# Patient Record
Sex: FEMALE | Race: WHITE | Employment: OTHER | URBAN - METROPOLITAN AREA
[De-identification: names, ages, dates, MRNs, and addresses within clinical notes are randomized per-mention and may not be internally consistent; named-entity substitution may affect disease eponyms.]

---

## 2017-06-19 ENCOUNTER — HOSPITAL ENCOUNTER (OUTPATIENT)
Dept: WOMENS IMAGING | Age: 77
Discharge: HOME | End: 2017-06-19
Payer: MEDICARE

## 2017-06-19 DIAGNOSIS — Z13.9 SCREENING: ICD-10-CM

## 2017-06-19 PROCEDURE — G0202 SCR MAMMO BI INCL CAD: HCPCS

## 2018-07-19 ENCOUNTER — HOSPITAL ENCOUNTER (OUTPATIENT)
Dept: WOMENS IMAGING | Age: 78
Discharge: HOME | End: 2018-07-21
Payer: MEDICARE

## 2018-07-19 DIAGNOSIS — Z12.31 ENCOUNTER FOR SCREENING MAMMOGRAM FOR BREAST CANCER: ICD-10-CM

## 2018-07-19 PROCEDURE — 77067 SCR MAMMO BI INCL CAD: CPT

## 2019-08-08 ENCOUNTER — HOSPITAL ENCOUNTER (OUTPATIENT)
Dept: WOMENS IMAGING | Age: 79
Discharge: HOME | End: 2019-08-10
Payer: MEDICARE

## 2019-08-08 DIAGNOSIS — Z12.31 ENCOUNTER FOR SCREENING MAMMOGRAM FOR BREAST CANCER: ICD-10-CM

## 2019-08-08 PROCEDURE — 77067 SCR MAMMO BI INCL CAD: CPT

## 2020-10-27 ENCOUNTER — HOSPITAL ENCOUNTER (OUTPATIENT)
Dept: WOMENS IMAGING | Age: 80
Discharge: HOME | End: 2020-10-29
Payer: MEDICARE

## 2020-10-27 PROCEDURE — 77067 SCR MAMMO BI INCL CAD: CPT

## 2023-01-17 ENCOUNTER — PREP FOR PROCEDURE (OUTPATIENT)
Dept: GASTROENTEROLOGY | Age: 83
End: 2023-01-17

## 2023-01-17 ENCOUNTER — TELEPHONE (OUTPATIENT)
Dept: GASTROENTEROLOGY | Age: 83
End: 2023-01-17

## 2023-01-17 ENCOUNTER — OFFICE VISIT (OUTPATIENT)
Dept: GASTROENTEROLOGY | Age: 83
End: 2023-01-17
Payer: MEDICARE

## 2023-01-17 VITALS
SYSTOLIC BLOOD PRESSURE: 118 MMHG | WEIGHT: 87 LBS | HEART RATE: 62 BPM | DIASTOLIC BLOOD PRESSURE: 62 MMHG | OXYGEN SATURATION: 100 % | BODY MASS INDEX: 16.42 KG/M2 | HEIGHT: 61 IN

## 2023-01-17 DIAGNOSIS — R10.13 EPIGASTRIC ABDOMINAL PAIN: Primary | ICD-10-CM

## 2023-01-17 PROCEDURE — 1123F ACP DISCUSS/DSCN MKR DOCD: CPT | Performed by: INTERNAL MEDICINE

## 2023-01-17 PROCEDURE — 99204 OFFICE O/P NEW MOD 45 MIN: CPT | Performed by: INTERNAL MEDICINE

## 2023-01-17 PROCEDURE — 4004F PT TOBACCO SCREEN RCVD TLK: CPT | Performed by: INTERNAL MEDICINE

## 2023-01-17 PROCEDURE — 1090F PRES/ABSN URINE INCON ASSESS: CPT | Performed by: INTERNAL MEDICINE

## 2023-01-17 PROCEDURE — G8400 PT W/DXA NO RESULTS DOC: HCPCS | Performed by: INTERNAL MEDICINE

## 2023-01-17 PROCEDURE — G8484 FLU IMMUNIZE NO ADMIN: HCPCS | Performed by: INTERNAL MEDICINE

## 2023-01-17 PROCEDURE — G8419 CALC BMI OUT NRM PARAM NOF/U: HCPCS | Performed by: INTERNAL MEDICINE

## 2023-01-17 PROCEDURE — G8427 DOCREV CUR MEDS BY ELIG CLIN: HCPCS | Performed by: INTERNAL MEDICINE

## 2023-01-17 RX ORDER — SUCRALFATE 1 G/1
1 TABLET ORAL 4 TIMES DAILY
Qty: 120 TABLET | Refills: 3 | Status: SHIPPED | OUTPATIENT
Start: 2023-01-17

## 2023-01-17 RX ORDER — DICYCLOMINE HCL 20 MG
20 TABLET ORAL DAILY
COMMUNITY
Start: 2021-10-18

## 2023-01-17 RX ORDER — PANTOPRAZOLE SODIUM 40 MG/1
TABLET, DELAYED RELEASE ORAL
COMMUNITY
Start: 2023-01-16

## 2023-01-17 RX ORDER — LORAZEPAM 0.5 MG/1
0.5 TABLET ORAL DAILY PRN
COMMUNITY
Start: 2019-03-12

## 2023-01-17 RX ORDER — FAMOTIDINE 20 MG/1
20 TABLET, FILM COATED ORAL 2 TIMES DAILY
COMMUNITY

## 2023-01-17 RX ORDER — SUCRALFATE ORAL 1 G/10ML
1 SUSPENSION ORAL 4 TIMES DAILY
Qty: 1200 ML | Refills: 3 | Status: SHIPPED | OUTPATIENT
Start: 2023-01-17

## 2023-01-17 RX ORDER — IBUPROFEN 200 MG
1 CAPSULE ORAL DAILY
COMMUNITY

## 2023-01-17 RX ORDER — ATORVASTATIN CALCIUM 10 MG/1
TABLET, FILM COATED ORAL
COMMUNITY
Start: 2022-11-16

## 2023-01-17 NOTE — PROGRESS NOTES
Gastroenterology Clinic Visit    Zuleika Ramon  [id number]  Chief Complaint   Patient presents with    New Patient     HPI: [de-identified] y.o. female presents to the clinic with her daughter history of sudden onset of upper abdominal pain that started approximately a week ago. Patient initially presented to the [facility] and then evaluated in the emergency room at [facility]. Patient reported pain across the upper abdomen under both ribs and epigastrium in the setting of not having bowel movements for 3 days. 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 Patient on evaluation emergency room showed normal CBC, comprehensive metabolic panel, lipase, CT scan of the abdomen with mildly dilated common bile duct measuring 11 mm in diameter with abrupt tapering at the head of the pancreas. No obvious pancreatic lesion was identified. Patient advised to undergo MRI/MRCP, however cannot proceed with MRCP given presence of a implanted device. Patient additionally was advised to follow-up with pancreatobiliary specialist.    Patient reports improvement but mild ongoing persistence of symptoms despite bowel movements. She was started on Bentyl which she takes once a day, and pantoprazole. She has been on Imodium for many years. She does endorse some weight loss, history of smoking smokes 2 to 4 cigarettes a day. Denies ever smoking more than 5 cigarettes a day. Denies any history of excess alcohol use. Given history of weight loss, history of smoking, abnormal CT scan a concern was raised in the emergency room regarding possible pancreatic lesion as the etiology for her symptoms and dilated common bile duct.   Patient subsequently made a urgent appointment here at [facility].    No family history of pancreatic disease/primary cancer    Past medical history of right rib fractures #4-8 in 2021 after a fall, incidental finding of moderate centrally lobular emphysema    Previous GI work up/Endoscopic investigations:   Colonoscopy: 2015 normal per patient, in  prior to this    Review of Systems   All other systems reviewed and are negative. No past medical history on file. No past surgical history on file. Current Outpatient Medications on File Prior to Visit   Medication Sig Dispense Refill    atorvastatin (LIPITOR) 10 MG tablet TAKE 1 TABLET EVERY DAY AT SUPPER TIME.      calcium carbonate (OYSTER SHELL CALCIUM 500 MG) 1250 (500 Ca) MG tablet Take 1 tablet by mouth daily      vitamin D3 (CHOLECALCIFEROL) 125 MCG (5000 UT) TABS tablet Take 5,000 Units by mouth daily      dicyclomine (BENTYL) 20 MG tablet Take 20 mg by mouth daily      LORazepam (ATIVAN) 0.5 MG tablet Take 0.5 mg by mouth daily as needed. pantoprazole (PROTONIX) 40 MG tablet       famotidine (PEPCID) 20 MG tablet Take 20 mg by mouth 2 times daily       No current facility-administered medications on file prior to visit. Family History   Problem Relation Age of Onset    Colon Cancer Neg Hx      Social History     Socioeconomic History    Marital status:    Tobacco Use    Smoking status: Former     Types: Cigarettes     Quit date: 2022     Years since quittin.0    Smokeless tobacco: Never   Substance and Sexual Activity    Alcohol use: Yes     Comment: rare    Drug use: Never     Blood pressure 118/62, pulse 62, height 5' 1\" (1.549 m), weight 87 lb (39.5 kg), SpO2 100 %. Physical Exam  Constitutional:       General: She is not in acute distress. Appearance: Normal appearance. She is well-developed. Eyes:      General: No scleral icterus. Cardiovascular:      Rate and Rhythm: Normal rate and regular rhythm. Pulmonary:      Effort: Pulmonary effort is normal.      Breath sounds: Normal breath sounds. Abdominal:      General: Bowel sounds are normal. There is no distension. Palpations: Abdomen is soft. There is no mass. Tenderness: There is no abdominal tenderness. There is no guarding or rebound. Musculoskeletal:         General: Normal range of motion. Lymphadenopathy:      Cervical: No cervical adenopathy. Neurological:      Mental Status: She is alert and oriented to person, place, and time. Psychiatric:         Behavior: Behavior normal.         Thought Content: Thought content normal.         Judgment: Judgment normal.     Laboratory, Pathology, Radiology reviewed indetail with relevant important investigations summarized below:  Lab Results   Component Value Date    WBC 5.5 04/27/2016    HGB 13.8 04/27/2016    HCT 41.7 04/27/2016    .3 (H) 04/27/2016     04/27/2016     No results found for: IRON, TIBC, FERRITIN  No results found for: [id number]   No results found for: FOLATE  Lab Results   Component Value Date    LABALBU 4.0 04/27/2016      Lab Results   Component Value Date    ALT 26 04/27/2016    AST 48 (H) 04/27/2016    ALKPHOS 67 04/27/2016    BILITOT 0.3 04/27/2016     CT abdomen/pelvis: 1/12/2023:  IMPRESSION:   1. Mildly dilated common bile duct measuring 11 mm in diameter with   abrupt tapering at the pancreatic head. No discrete pancreatic head mass   is identified. Assessment and Plan:  [de-identified] y.o. female with abrupt onset of upper abdominal pain, CT scan with concerns regarding mildly dilated common bile duct and abrupt tapering in the pancreas. Symptoms likely secondary to bowel issues, however given above findings concerns were raised for presence of a lesion within the pancreas as the etiology for symptoms. MRCP/MRI unable to be performed due to presence of a implanted device.   We will proceed with EUS +/- FNA +/- ERCP examination of the common bile duct and pancreas to evaluate further    Follow-up in 4 to 6 weeks, plan on completing EUS examination in next 1 to 2 weeks    Ervin De Santiago MD   Staff Gastroenterologist  [facility] [address]    Please note this report has been partially produced using speech recognition software and may cause or contain errors related to thatsystem including grammar, punctuation and spelling as well as words and phrases that may seem inappropriate. If there are questions or concerns please feel free to contact me to clarify.

## 2023-01-18 ENCOUNTER — ANESTHESIA EVENT (OUTPATIENT)
Dept: ENDOSCOPY | Age: 83
End: 2023-01-18
Payer: MEDICARE

## 2023-01-19 ENCOUNTER — HOSPITAL ENCOUNTER (OUTPATIENT)
Age: 83
Setting detail: OUTPATIENT SURGERY
Discharge: HOME | End: 2023-01-19
Attending: INTERNAL MEDICINE | Admitting: INTERNAL MEDICINE
Payer: MEDICARE

## 2023-01-19 ENCOUNTER — ANESTHESIA (OUTPATIENT)
Dept: ENDOSCOPY | Age: 83
End: 2023-01-19
Payer: MEDICARE

## 2023-01-19 VITALS
HEART RATE: 55 BPM | OXYGEN SATURATION: 98 % | DIASTOLIC BLOOD PRESSURE: 62 MMHG | SYSTOLIC BLOOD PRESSURE: 139 MMHG | RESPIRATION RATE: 18 BRPM | WEIGHT: 87 LBS | HEIGHT: 61 IN | BODY MASS INDEX: 16.42 KG/M2 | TEMPERATURE: 98.5 F

## 2023-01-19 DIAGNOSIS — R10.13 EPIGASTRIC ABDOMINAL PAIN: ICD-10-CM

## 2023-01-19 PROCEDURE — 3700000000 HC ANESTHESIA ATTENDED CARE: Performed by: INTERNAL MEDICINE

## 2023-01-19 PROCEDURE — 3700000001 HC ADD 15 MINUTES (ANESTHESIA): Performed by: INTERNAL MEDICINE

## 2023-01-19 PROCEDURE — 3609017100 HC EGD: Performed by: INTERNAL MEDICINE

## 2023-01-19 PROCEDURE — 6370000000 HC RX 637 (ALT 250 FOR IP): Performed by: INTERNAL MEDICINE

## 2023-01-19 PROCEDURE — 3609018500 HC EGD US SCOPE W/ADJACENT STRUCTURES: Performed by: INTERNAL MEDICINE

## 2023-01-19 PROCEDURE — 2580000003 HC RX 258: Performed by: INTERNAL MEDICINE

## 2023-01-19 PROCEDURE — 2709999900 HC NON-CHARGEABLE SUPPLY: Performed by: INTERNAL MEDICINE

## 2023-01-19 PROCEDURE — 7100000011 HC PHASE II RECOVERY - ADDTL 15 MIN: Performed by: INTERNAL MEDICINE

## 2023-01-19 PROCEDURE — 43237 ENDOSCOPIC US EXAM ESOPH: CPT | Performed by: INTERNAL MEDICINE

## 2023-01-19 PROCEDURE — 6360000002 HC RX W HCPCS: Performed by: NURSE ANESTHETIST, CERTIFIED REGISTERED

## 2023-01-19 PROCEDURE — 7100000010 HC PHASE II RECOVERY - FIRST 15 MIN: Performed by: INTERNAL MEDICINE

## 2023-01-19 PROCEDURE — C1753 CATH, INTRAVAS ULTRASOUND: HCPCS | Performed by: INTERNAL MEDICINE

## 2023-01-19 PROCEDURE — 2580000003 HC RX 258

## 2023-01-19 RX ORDER — SODIUM CHLORIDE 9 MG/ML
INJECTION, SOLUTION INTRAVENOUS PRN
Status: CANCELLED | OUTPATIENT
Start: 2023-01-19

## 2023-01-19 RX ORDER — SODIUM CHLORIDE 9 MG/ML
INJECTION, SOLUTION INTRAVENOUS
Status: COMPLETED
Start: 2023-01-19 | End: 2023-01-19

## 2023-01-19 RX ORDER — SODIUM CHLORIDE 9 MG/ML
INJECTION, SOLUTION INTRAVENOUS CONTINUOUS
Status: DISCONTINUED | OUTPATIENT
Start: 2023-01-19 | End: 2023-01-19 | Stop reason: HOSPADM

## 2023-01-19 RX ORDER — ONDANSETRON 2 MG/ML
4 INJECTION INTRAMUSCULAR; INTRAVENOUS
Status: CANCELLED | OUTPATIENT
Start: 2023-01-19 | End: 2023-01-20

## 2023-01-19 RX ORDER — SODIUM CHLORIDE 0.9 % (FLUSH) 0.9 %
5-40 SYRINGE (ML) INJECTION PRN
Status: CANCELLED | OUTPATIENT
Start: 2023-01-19

## 2023-01-19 RX ORDER — SIMETHICONE 20 MG/.3ML
EMULSION ORAL PRN
Status: DISCONTINUED | OUTPATIENT
Start: 2023-01-19 | End: 2023-01-19 | Stop reason: ALTCHOICE

## 2023-01-19 RX ORDER — PROPOFOL 10 MG/ML
INJECTION, EMULSION INTRAVENOUS PRN
Status: DISCONTINUED | OUTPATIENT
Start: 2023-01-19 | End: 2023-01-19 | Stop reason: SDUPTHER

## 2023-01-19 RX ORDER — CYCLOSPORINE 0.5 MG/ML
1 EMULSION OPHTHALMIC 2 TIMES DAILY
COMMUNITY

## 2023-01-19 RX ORDER — DORZOLAMIDE HCL 20 MG/ML
2 SOLUTION/ DROPS OPHTHALMIC 3 TIMES DAILY
COMMUNITY

## 2023-01-19 RX ORDER — SIMETHICONE 20 MG/.3ML
EMULSION ORAL
Status: DISCONTINUED
Start: 2023-01-19 | End: 2023-01-19 | Stop reason: HOSPADM

## 2023-01-19 RX ORDER — SODIUM CHLORIDE 0.9 % (FLUSH) 0.9 %
5-40 SYRINGE (ML) INJECTION EVERY 12 HOURS SCHEDULED
Status: CANCELLED | OUTPATIENT
Start: 2023-01-19

## 2023-01-19 RX ORDER — VITAMIN B COMPLEX
1 CAPSULE ORAL DAILY
COMMUNITY

## 2023-01-19 RX ORDER — MAGNESIUM HYDROXIDE 1200 MG/15ML
LIQUID ORAL PRN
Status: DISCONTINUED | OUTPATIENT
Start: 2023-01-19 | End: 2023-01-19 | Stop reason: ALTCHOICE

## 2023-01-19 RX ADMIN — PROPOFOL 100 MG: 10 INJECTION, EMULSION INTRAVENOUS at 07:55

## 2023-01-19 RX ADMIN — PROPOFOL 100 MG: 10 INJECTION, EMULSION INTRAVENOUS at 08:03

## 2023-01-19 RX ADMIN — PROPOFOL 100 MG: 10 INJECTION, EMULSION INTRAVENOUS at 07:48

## 2023-01-19 RX ADMIN — SODIUM CHLORIDE: 9 INJECTION, SOLUTION INTRAVENOUS at 07:03

## 2023-01-19 ASSESSMENT — LIFESTYLE VARIABLES: SMOKING_STATUS: 1

## 2023-01-19 ASSESSMENT — PAIN SCALES - GENERAL: PAINLEVEL_OUTOF10: 4

## 2023-01-19 ASSESSMENT — PAIN - FUNCTIONAL ASSESSMENT
PAIN_FUNCTIONAL_ASSESSMENT: 0-10
PAIN_FUNCTIONAL_ASSESSMENT: ACTIVITIES ARE NOT PREVENTED

## 2023-01-19 ASSESSMENT — PAIN DESCRIPTION - DESCRIPTORS: DESCRIPTORS: ACHING

## 2023-01-19 NOTE — ANESTHESIA POSTPROCEDURE EVALUATION
Department of Anesthesiology  Postprocedure Note    Patient: Chong Garcia  MRN: [id number]  YOB: 1940  Date of evaluation: 1/19/2023      Procedure Summary     Date: 01/19/23 Room / Location: [address] [place] [address]    Anesthesia Start: 9971 Anesthesia Stop: 6647    Procedures:       EGD DIAGNOSTIC ONLY     ([facility] per Dr. Dung Dhillon to schedule )      ENDOSCOPIC ULTRASOUND    ([facility] per Dr. Dung Dhillon to schedule ) Diagnosis:       Epigastric abdominal pain      (Epigastric abdominal pain [R10.13])    Surgeons: Rebecca Deng MD Responsible Provider: CARRIE Shi CRNA    Anesthesia Type: MAC ASA Status: 2          Anesthesia Type: No value filed.     Felix Phase I: Felix Score: 10    Felix Phase II:        Anesthesia Post Evaluation    Patient location during evaluation: bedside  Patient participation: complete - patient participated  Level of consciousness: awake and awake and alert  Airway patency: patent  Nausea & Vomiting: no nausea and no vomiting  Complications: no  Cardiovascular status: blood pressure returned to baseline and hemodynamically stable  Respiratory status: acceptable  Hydration status: euvolemic

## 2023-01-19 NOTE — ANESTHESIA PRE PROCEDURE
Department of Anesthesiology  Preprocedure Note       Name:  Prabhu Alexander   Age:  [de-identified] y.o.  :  1940                                          MRN:  [id number]         Date:  2023      Surgeon: Mindy Treadwell):  Liss Rodriguez MD    Procedure: Procedure(s):  EGD DIAGNOSTIC ONLY     ([id number] [facility] per Dr. Mingo Luna to schedule )  ENDOSCOPIC ULTRASOUND    ([id number] [facility] per Dr. Mingo Luna to schedule )    Medications prior to admission:   Prior to Admission medications    Medication Sig Start Date End Date Taking? Authorizing Provider   b complex vitamins capsule Take 1 capsule by mouth daily   Yes Historical Provider, MD   bimatoprost (LUMIGAN) 0.01 % SOLN ophthalmic drops Place 1 drop into both eyes nightly   Yes Historical Provider, MD   dorzolamide (TRUSOPT) 2 % ophthalmic solution 2 drops 3 times daily   Yes Historical Provider, MD   cycloSPORINE (RESTASIS) 0.05 % ophthalmic emulsion 1 drop 2 times daily   Yes Historical Provider, MD   ALENDRONATE SODIUM PO Take by mouth   Yes Historical Provider, MD   atorvastatin (LIPITOR) 10 MG tablet TAKE 1 TABLET EVERY DAY AT SUPPER TIME. 22   Historical Provider, MD   calcium carbonate (OYSTER SHELL CALCIUM 500 MG) 1250 (500 Ca) MG tablet Take 1 tablet by mouth daily    Historical Provider, MD   vitamin D3 (CHOLECALCIFEROL) 125 MCG (5000 UT) TABS tablet Take 5,000 Units by mouth daily 10/18/21   Historical Provider, MD   dicyclomine (BENTYL) 20 MG tablet Take 20 mg by mouth daily 10/18/21   Historical Provider, MD   LORazepam (ATIVAN) 0.5 MG tablet Take 0.5 mg by mouth daily as needed.  3/12/19   Historical Provider, MD   pantoprazole (PROTONIX) 40 MG tablet  23   Historical Provider, MD   famotidine (PEPCID) 20 MG tablet Take 20 mg by mouth 2 times daily    Historical Provider, MD   sucralfate (CARAFATE) 1 GM/10ML suspension Take 10 mLs by mouth 4 times daily  Patient not taking: Reported on 2023   Liss Rodriguez MD   sucralfate (CARAFATE) 1 GM tablet Take 1 tablet by mouth 4 times daily 23   CARRIE Jones - CNP       Current medications:    Current Facility-Administered Medications   Medication Dose Route Frequency Provider Last Rate Last Admin    simethicone (MYLICON) 40 GD/8.5MM drops             0.9 % sodium chloride infusion   IntraVENous Continuous Kathleen Nj  mL/hr at 23 0703 New Bag at 23 0703    sterile water for irrigation    PRN Kathleen Nj MD   1,000 mL at 23 0707    simethicone (MYLICON) 40 YV/8.6WX drops    PRN Kathleen Nj MD   40 mg at 23 0708       Allergies: Allergies   Allergen Reactions    Tramadol Swelling       Problem List:    Patient Active Problem List   Diagnosis Code    Epigastric abdominal pain R10.13       Past Medical History:        Diagnosis Date    Hyperlipidemia        Past Surgical History:  History reviewed. No pertinent surgical history. Social History:    Social History     Tobacco Use    Smoking status: Every Day     Packs/day: 0.25     Types: Cigarettes     Last attempt to quit: 2022     Years since quittin.0    Smokeless tobacco: Never   Substance Use Topics    Alcohol use: Yes     Comment: rare                                Ready to quit: Not Answered  Counseling given: Not Answered      Vital Signs (Current):   Vitals:    23 0658   BP: 134/75   Pulse: 60   Resp: 18   Temp: 36.9 °C (98.5 °F)   TempSrc: Temporal   SpO2: 98%   Weight: 87 lb (39.5 kg)   Height: 5' 1\" (1.549 m)                                              BP Readings from Last 3 Encounters:   23 134/75   23 118/62       NPO Status: Time of last liquid consumption: 2200                        Time of last solid consumption: 1700                        Date of last liquid consumption: 23                        Date of last solid food consumption: 23    BMI:   Wt Readings from Last 3 Encounters:   23 87 lb (39.5 kg)   23 87 lb (39.5 kg)     Body mass index is 16.44 kg/m².     CBC: Lab Results   Component Value Date/Time    WBC 5.5 04/27/2016 10:44 PM    RBC 4.11 04/27/2016 10:44 PM    HGB 13.8 04/27/2016 10:44 PM    HCT 41.7 04/27/2016 10:44 PM    .3 04/27/2016 10:44 PM    RDW 13.1 04/27/2016 10:44 PM     04/27/2016 10:44 PM       CMP:   Lab Results   Component Value Date/Time     04/27/2016 10:44 PM    K 3.6 04/27/2016 10:44 PM     04/27/2016 10:44 PM    CO2 22 04/27/2016 10:44 PM    BUN 20 04/27/2016 10:44 PM    CREATININE 0.72 04/27/2016 10:44 PM    GFRAA >60.0 04/27/2016 10:44 PM    LABGLOM >60.0 04/27/2016 10:44 PM    GLUCOSE 171 04/27/2016 10:44 PM    PROT 6.3 04/27/2016 10:44 PM    CALCIUM 9.1 04/27/2016 10:44 PM    BILITOT 0.3 04/27/2016 10:44 PM    ALKPHOS 67 04/27/2016 10:44 PM    AST 48 04/27/2016 10:44 PM    ALT 26 04/27/2016 10:44 PM       POC Tests: No results for input(s): POCGLU, POCNA, POCK, POCCL, POCBUN, POCHEMO, POCHCT in the last 72 hours. Coags: No results found for: PROTIME, INR, APTT    HCG (If Applicable): No results found for: PREGTESTUR, PREGSERUM, HCG, HCGQUANT     ABGs: No results found for: PHART, PO2ART, OEK0VJN, CYA6XRQ, BEART, C8WXSJCE     Type & Screen (If Applicable):  No results found for: LABABO, LABRH    Drug/Infectious Status (If Applicable):  No results found for: HIV, HEPCAB    COVID-19 Screening (If Applicable): No results found for: COVID19        Anesthesia Evaluation  Patient summary reviewed and Nursing notes reviewed  Airway: Mallampati: II          Dental: normal exam         Pulmonary:normal exam    (+) current smoker          Patient did not smoke on day of surgery. Cardiovascular:                      Neuro/Psych:               GI/Hepatic/Renal:   (+) GERD:,           Endo/Other:                     Abdominal:             Vascular: Other Findings:           Anesthesia Plan      MAC     ASA 2       Induction: intravenous.       Anesthetic plan and risks discussed with patient.                         Ron Wren, APRN - CRNA   1/19/2023

## 2023-01-19 NOTE — H&P
Patient Name: Isaiah Hills  : 1940  MRN: [id number]  DATE: 23      ENDOSCOPY  History and Physical    Procedure:    [] Diagnostic Colonoscopy       [] Screening Colonoscopy  [x] EGD      [] ERCP      [x] EUS       [] Other    [x] Previous office notes/History and Physical reviewed from the patients chart. Please see EMR for further details of HPI. I have examined the patient's status immediately prior to the procedure and:      Indications/HPI:    []Abdominal Pain   []Cancer- GI/Lung  []Fhx of colon CA  []History of Polyps   []Valerios   []Melena  []Abnormal Imaging   []Dysphagia    []Persistent Pneumonia  []Anemia   []Food Impaction  []History of Polyps  []GI Bleed   []Pulmonary nodule/Mass  []Change in bowel habits  []Heartburn/Reflux  []Rectal Bleed (BRBPR)  []Chest Pain - Non Cardiac  []Heme (+) Stool  []Ulcers  []Constipation   []Hemoptysis   []Varices  []Diarrhea   []Hypoxemia  []Nausea/Vomiting   []Screening   []Crohns/Colitis  [x]Other: [de-identified] y.o. female with abrupt onset of upper abdominal pain, CT scan with concerns regarding mildly dilated common bile duct and abrupt tapering in the pancreas. Symptoms likely secondary to bowel issues, however given above findings concerns were raised for presence of a lesion within the pancreas as the etiology for symptoms. MRCP/MRI unable to be performed due to presence of a implanted device. We will proceed with EUS +/- FNA +/- ERCP examination of the common bile duct and pancreas to evaluate further    Anesthesia:   [x] MAC [] Moderate Sedation   [] General   [] None     ROS: 12 pt Review of Symptoms was negative unless mentioned above    Medications:   Prior to Admission medications    Medication Sig Start Date End Date Taking?  Authorizing Provider   b complex vitamins capsule Take 1 capsule by mouth daily   Yes Historical Provider, MD   bimatoprost (LUMIGAN) 0.01 % SOLN ophthalmic drops Place 1 drop into both eyes nightly   Yes Historical Provider, MD dorzolamide (TRUSOPT) 2 % ophthalmic solution 2 drops 3 times daily   Yes Historical Provider, MD   cycloSPORINE (RESTASIS) 0.05 % ophthalmic emulsion 1 drop 2 times daily   Yes Historical Provider, MD   ALENDRONATE SODIUM PO Take by mouth   Yes Historical Provider, MD   atorvastatin (LIPITOR) 10 MG tablet TAKE 1 TABLET EVERY DAY AT SUPPER TIME. 22   Historical Provider, MD   calcium carbonate (OYSTER SHELL CALCIUM 500 MG) 1250 (500 Ca) MG tablet Take 1 tablet by mouth daily    Historical Provider, MD   vitamin D3 (CHOLECALCIFEROL) 125 MCG (5000 UT) TABS tablet Take 5,000 Units by mouth daily 10/18/21   Historical Provider, MD   dicyclomine (BENTYL) 20 MG tablet Take 20 mg by mouth daily 10/18/21   Historical Provider, MD   LORazepam (ATIVAN) 0.5 MG tablet Take 0.5 mg by mouth daily as needed. 3/12/19   Historical Provider, MD   pantoprazole (PROTONIX) 40 MG tablet  23   Historical Provider, MD   famotidine (PEPCID) 20 MG tablet Take 20 mg by mouth 2 times daily    Historical Provider, MD   sucralfate (CARAFATE) 1 GM/10ML suspension Take 10 mLs by mouth 4 times daily  Patient not taking: Reported on 2023   Sharmaine Forde MD   sucralfate (CARAFATE) 1 GM tablet Take 1 tablet by mouth 4 times daily 23   Alejandra Poly, APRN - CNP     Allergies: Allergies   Allergen Reactions    Tramadol Swelling      History of allergic reaction to anesthesia:  No  Past Medical History:  Past Medical History:   Diagnosis Date    Hyperlipidemia      Past Surgical History:  History reviewed. No pertinent surgical history.   Social History:  Social History     Tobacco Use    Smoking status: Every Day     Packs/day: 0.25     Types: Cigarettes     Last attempt to quit: 2022     Years since quittin.0    Smokeless tobacco: Never   Vaping Use    Vaping Use: Never used   Substance Use Topics    Alcohol use: Yes     Comment: rare    Drug use: Never     Vital Signs:   Vitals:    23 0658   BP: 134/75 Pulse: 60   Resp: 18   Temp: 98.5 °F (36.9 °C)   SpO2: 98%       Physical Exam:  Cardiac:  [x]WNL []Comments:  Pulmonary:  [x]WNL []Comments:   Neuro/Mental Status:  [x]WNL []Comments:  Abdominal:  [x]WNL []Comments:  Other:   []WNL []Comments:    Informed Consent:  The risks and benefits of the procedure have been discussed with either the patient or if they cannot consent, their representative. Assessment:  Patient examined and appropriate for planned sedation and procedure. Plan:  Proceed with planned sedation and procedure as above. The patient was counseled at length about risks of becca COVID-19 in the perioperative and any recovery window from the procedure. The patient was made aware that becca COVID-19 may worsen their prognosis for recovery from their procedure and lend to a higher morbidity and-all mortality risk. The patient was given the option of postponing the procedure all material risks, benefits, and alternatives were discussed. The patient does wish to proceed with the procedure at this time.     Ervin De Santiago MD  8:16 AM

## 2023-01-20 ENCOUNTER — TELEPHONE (OUTPATIENT)
Dept: GASTROENTEROLOGY | Age: 83
End: 2023-01-20

## 2023-01-20 DIAGNOSIS — R10.13 EPIGASTRIC ABDOMINAL PAIN: Primary | ICD-10-CM

## 2023-01-20 DIAGNOSIS — K83.8 DILATED BILE DUCT: ICD-10-CM

## 2023-01-20 NOTE — TELEPHONE ENCOUNTER
The patient is requesting lab orders.   We discussed on the 2- for her to have them complete since her appointment is on 2-

## 2023-02-23 ENCOUNTER — OFFICE VISIT (OUTPATIENT)
Dept: GASTROENTEROLOGY | Age: 83
End: 2023-02-23
Payer: MEDICARE

## 2023-02-23 VITALS
HEART RATE: 64 BPM | BODY MASS INDEX: 16.62 KG/M2 | HEIGHT: 61 IN | DIASTOLIC BLOOD PRESSURE: 64 MMHG | SYSTOLIC BLOOD PRESSURE: 122 MMHG | OXYGEN SATURATION: 99 % | WEIGHT: 88 LBS

## 2023-02-23 DIAGNOSIS — R10.13 EPIGASTRIC ABDOMINAL PAIN: Primary | ICD-10-CM

## 2023-02-23 DIAGNOSIS — K58.2 IRRITABLE BOWEL SYNDROME WITH BOTH CONSTIPATION AND DIARRHEA: ICD-10-CM

## 2023-02-23 DIAGNOSIS — K83.8 DILATED BILE DUCT: ICD-10-CM

## 2023-02-23 PROCEDURE — 1090F PRES/ABSN URINE INCON ASSESS: CPT | Performed by: NURSE PRACTITIONER

## 2023-02-23 PROCEDURE — G8400 PT W/DXA NO RESULTS DOC: HCPCS | Performed by: NURSE PRACTITIONER

## 2023-02-23 PROCEDURE — G8484 FLU IMMUNIZE NO ADMIN: HCPCS | Performed by: NURSE PRACTITIONER

## 2023-02-23 PROCEDURE — G8427 DOCREV CUR MEDS BY ELIG CLIN: HCPCS | Performed by: NURSE PRACTITIONER

## 2023-02-23 PROCEDURE — 99213 OFFICE O/P EST LOW 20 MIN: CPT | Performed by: NURSE PRACTITIONER

## 2023-02-23 PROCEDURE — 4004F PT TOBACCO SCREEN RCVD TLK: CPT | Performed by: NURSE PRACTITIONER

## 2023-02-23 PROCEDURE — 1123F ACP DISCUSS/DSCN MKR DOCD: CPT | Performed by: NURSE PRACTITIONER

## 2023-02-23 PROCEDURE — G8419 CALC BMI OUT NRM PARAM NOF/U: HCPCS | Performed by: NURSE PRACTITIONER

## 2023-02-23 NOTE — PROGRESS NOTES
Gastroenterology Clinic Follow up Visit    Karime Walton  [id number]  Chief Complaint   Patient presents with    Follow-up     HPI: [de-identified] y.o. female following up after last GI clinic on 1/17/2023. S/p EGD/EUS on 1/19/2023. Interval change: Patient reports doing well since her procedure. States her upper abdominal pain resolved approximately 1 week ago. States she took MiraLAX daily for a few days with subsequent diarrhea for multiple days that has since subsided. States she is currently having a formed bowel movement daily without issue. However, prior to her recent episode of abdominal pain, she did have somewhat hard stools with associated straining to have a bowel movement daily. She denies hematochezia or melena. States she has history of IBS, diarrhea predominant, has been taking Bentyl 20 mg twice daily for many years (>10 years). Currently, she is not taking any PPI or Carafate as previously prescribed. She denies GERD symptoms, nausea/vomiting, hematemesis or dysphagia. States that she has gained a pound since her previous GI clinic visit. HPI from last GI clinic visit on 1/17/2023  summarized below:  [de-identified] y.o. female presents to the clinic with her daughter history of sudden onset of upper abdominal pain that started approximately a week ago. Patient initially presented to the [facility] and then evaluated in the emergency room at [facility]. Patient reported pain across the upper abdomen under both ribs and epigastrium in the setting of not having bowel movements for 3 days. 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 Patient on evaluation emergency room showed normal CBC, comprehensive metabolic panel, lipase, CT scan of the abdomen with mildly dilated common bile duct measuring 11 mm in diameter with abrupt tapering at the head of the pancreas.   No obvious pancreatic lesion was identified. Patient advised to undergo MRI/MRCP, however cannot proceed with MRCP given presence of a implanted device. Patient additionally was advised to follow-up with pancreatobiliary specialist.     Patient reports improvement but mild ongoing persistence of symptoms despite bowel movements. She was started on Bentyl which she takes once a day, and pantoprazole. She has been on Imodium for many years. She does endorse some weight loss, history of smoking smokes 2 to 4 cigarettes a day. Denies ever smoking more than 5 cigarettes a day. Denies any history of excess alcohol use. Given history of weight loss, history of smoking, abnormal CT scan a concern was raised in the emergency room regarding possible pancreatic lesion as the etiology for her symptoms and dilated common bile duct. Patient subsequently made a urgent appointment here at [facility].     No family history of pancreatic disease/primary cancer     Past medical history of right rib fractures #4-8 in October 2021 after a fall, incidental finding of moderate centrally lobular emphysema     Previous GI work up/Endoscopic investigations:   EGD/EUS 1/19/23: Normal esophagus. Esophagogastric landmarks identified. Normal stomach. Normal examined duodenum. No specimens collected. There was dilation in the common bile duct which measured up to 11 mm. There was no sign of significant pathology in the entire pancreas. There was no evidence of significant pathology in the visualized portion of the liver. Colonoscopy: 2015 normal per patient, in 2012 prior to this    Review of Systems   All other systems reviewed and are negative. Past medical history, past surgical history, medication list, social and familyhistory reviewed    Blood pressure 122/64, pulse 64, height 5' 1\" (1.549 m), weight 88 lb (39.9 kg), SpO2 99 %. Physical Exam  Constitutional:       General: She is not in acute distress. Appearance: Normal appearance. She is underweight. She is not ill-appearing. HENT:      Head: Normocephalic and atraumatic. Eyes:      General: No scleral icterus. Cardiovascular:      Rate and Rhythm: Normal rate and regular rhythm. Pulses: Normal pulses. Pulmonary:      Effort: Pulmonary effort is normal. No respiratory distress. Breath sounds: Normal breath sounds. Abdominal:      General: Bowel sounds are normal. There is no distension. Palpations: Abdomen is soft. There is no mass. Tenderness: There is no abdominal tenderness. There is no guarding or rebound. Musculoskeletal:         General: Normal range of motion. Lymphadenopathy:      Cervical: No cervical adenopathy. Skin:     General: Skin is warm and dry. Coloration: Skin is not jaundiced. Neurological:      Mental Status: She is alert and oriented to person, place, and time. Psychiatric:         Mood and Affect: Mood normal.         Behavior: Behavior normal.         Thought Content: Thought content normal.         Judgment: Judgment normal.     Laboratory, Pathology, Radiology reviewed in detail with relevantimportant investigations summarized below:    No results for input(s): WBC, HGB, HCT, MCV, PLT in the last 720 hours. Lab Results   Component Value Date    WBC 5.5 04/27/2016    HGB 13.8 04/27/2016    HCT 41.7 04/27/2016    .3 (H) 04/27/2016     04/27/2016     Lab Results   Component Value Date    ALT 20 02/13/2023    AST 29 02/13/2023    ALKPHOS 78 02/13/2023    BILITOT 0.3 02/13/2023     CT abdomen/pelvis: 1/12/2023:  IMPRESSION:   1. Mildly dilated common bile duct measuring 11 mm in diameter with   abrupt tapering at the pancreatic head. No discrete pancreatic head mass   is identified. Assessment and Plan:   Naz Peer [de-identified] y.o. female with abrupt onset of upper abdominal pain, CT scan with concerns regarding mildly dilated common bile duct and abrupt tapering in the pancreas concerning for possible lesion. MRI/MRCP unable to be performed due to presence of implanted device. S/p EUS with no pancreatic abnormality, noted CBD dilated to 11 mm, no evidence for stones or obstruction. Symptoms likely secondary to bowel issues (history of IBS-D) has been on Bentyl 20 mg twice daily for many years. Reported hard stools with associated straining prior to her episode of abdominal pain. Abdominal pain has subsided after taking MiraLAX with subsequent diarrheal symptoms. 1. Epigastric abdominal pain  2. Dilated bile duct  -Recent EGD/EUS with no pancreatic lesion/abnormality. -Given no abnormality, D/C PPI, H2 blocker and carafate. 3. Irritable bowel syndrome with both constipation and diarrhea  -Emphasized importance of increased fluid, fiber intake and physical activity.   -Discussed decreasing Bentyl as tolerated, if symptoms return may go back to taking twice daily  -Discussed Miralax 17 g of powder dissolved in 8 oz of water once as needed and titrate up or down (to a maximum of 34 g daily) to effect. Return if symptoms worsen or fail to improve. Pearl Willingham, APRN - CNP   Staff Gastroenterology Nurse Practitioner  [facility]    Please note this report has been partially produced using speech recognition software and contain errors related to that system including grammar, punctuation and spelling as well as words and phrases that may seem inappropriate. If there are questions or concerns please feel free to contact me to clarify.

## 2023-06-23 ENCOUNTER — HOSPITAL ENCOUNTER (EMERGENCY)
Age: 83
Discharge: HOME | End: 2023-06-23
Attending: STUDENT IN AN ORGANIZED HEALTH CARE EDUCATION/TRAINING PROGRAM
Payer: MEDICARE

## 2023-06-23 ENCOUNTER — APPOINTMENT (OUTPATIENT)
Dept: GENERAL RADIOLOGY | Age: 83
End: 2023-06-23
Payer: MEDICARE

## 2023-06-23 ENCOUNTER — APPOINTMENT (OUTPATIENT)
Dept: CT IMAGING | Age: 83
End: 2023-06-23
Payer: MEDICARE

## 2023-06-23 VITALS
HEART RATE: 57 BPM | OXYGEN SATURATION: 98 % | RESPIRATION RATE: 18 BRPM | BODY MASS INDEX: 16.42 KG/M2 | TEMPERATURE: 97.4 F | SYSTOLIC BLOOD PRESSURE: 126 MMHG | DIASTOLIC BLOOD PRESSURE: 63 MMHG | WEIGHT: 87 LBS | HEIGHT: 61 IN

## 2023-06-23 DIAGNOSIS — S09.90XA CLOSED HEAD INJURY, INITIAL ENCOUNTER: ICD-10-CM
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
## 2023-06-23 DIAGNOSIS — S63.501A SPRAIN OF RIGHT WRIST, INITIAL ENCOUNTER: ICD-10-CM

## 2023-06-23 DIAGNOSIS — M25.562 ACUTE PAIN OF LEFT KNEE: ICD-10-CM

## 2023-06-23 PROCEDURE — 99285 EMERGENCY DEPT VISIT HI MDM: CPT

## 2023-06-23 PROCEDURE — 6370000000 HC RX 637 (ALT 250 FOR IP): Performed by: STUDENT IN AN ORGANIZED HEALTH CARE EDUCATION/TRAINING PROGRAM

## 2023-06-23 PROCEDURE — 90471 IMMUNIZATION ADMIN: CPT | Performed by: STUDENT IN AN ORGANIZED HEALTH CARE EDUCATION/TRAINING PROGRAM

## 2023-06-23 PROCEDURE — 70450 CT HEAD/BRAIN W/O DYE: CPT

## 2023-06-23 PROCEDURE — 73110 X-RAY EXAM OF WRIST: CPT

## 2023-06-23 PROCEDURE — 73562 X-RAY EXAM OF KNEE 3: CPT

## 2023-06-23 PROCEDURE — 6360000002 HC RX W HCPCS: Performed by: STUDENT IN AN ORGANIZED HEALTH CARE EDUCATION/TRAINING PROGRAM

## 2023-06-23 PROCEDURE — 71250 CT THORAX DX C-: CPT

## 2023-06-23 PROCEDURE — 90715 TDAP VACCINE 7 YRS/> IM: CPT | Performed by: STUDENT IN AN ORGANIZED HEALTH CARE EDUCATION/TRAINING PROGRAM

## 2023-06-23 RX ORDER — ACETAMINOPHEN 500 MG
1000 TABLET ORAL ONCE
Status: COMPLETED | OUTPATIENT
Start: 2023-06-23 | End: 2023-06-23

## 2023-06-23 RX ORDER — ACETAMINOPHEN 500 MG
1000 TABLET ORAL EVERY 6 HOURS PRN
Qty: 60 TABLET | Refills: 0 | Status: SHIPPED | OUTPATIENT
Start: 2023-06-23

## 2023-06-23 RX ORDER — BACITRACIN ZINC 500 [USP'U]/G
OINTMENT TOPICAL ONCE
Status: COMPLETED | OUTPATIENT
Start: 2023-06-23 | End: 2023-06-23

## 2023-06-23 RX ORDER — IBUPROFEN 400 MG/1
400 TABLET ORAL EVERY 6 HOURS PRN
Qty: 120 TABLET | Refills: 0 | Status: SHIPPED | OUTPATIENT
Start: 2023-06-23

## 2023-06-23 RX ADMIN — BACITRACIN ZINC: 500 OINTMENT TOPICAL at 18:29

## 2023-06-23 RX ADMIN — ACETAMINOPHEN 1000 MG: 500 TABLET ORAL at 18:25

## 2023-06-23 RX ADMIN — TETANUS TOXOID, REDUCED DIPHTHERIA TOXOID AND ACELLULAR PERTUSSIS VACCINE, ADSORBED 0.5 ML: 5; 2.5; 8; 8; 2.5 SUSPENSION INTRAMUSCULAR at 18:26

## 2023-06-23 ASSESSMENT — PAIN DESCRIPTION - LOCATION
LOCATION: KNEE
LOCATION: HEAD

## 2023-06-23 ASSESSMENT — PAIN DESCRIPTION - DESCRIPTORS
DESCRIPTORS: ACHING
DESCRIPTORS: ACHING

## 2023-06-23 ASSESSMENT — PAIN - FUNCTIONAL ASSESSMENT: PAIN_FUNCTIONAL_ASSESSMENT: 0-10

## 2023-06-23 ASSESSMENT — PAIN SCALES - GENERAL
PAINLEVEL_OUTOF10: 5
PAINLEVEL_OUTOF10: 3

## 2023-06-23 ASSESSMENT — PAIN DESCRIPTION - ORIENTATION: ORIENTATION: RIGHT

## 2023-06-23 NOTE — ED PROVIDER NOTES
[address] [facility] ED  eMERGENCY dEPARTMENT eNCOUnter      Pt Name: Steven Tim  MRN: [id number]  [place] 1940  Date of evaluation: 6/23/2023  Provider: Sindi Schmitt MD  Note Started: 6/23/23 5:25 PM EDT    HISTORY OF PRESENT ILLNESS      Chief Complaint   Patient presents with   Christoph Brock       The history is provided by the Patient and Family (Daughter). Steven Tim is a 82 y.o. female with a PMH clinically significant for  Glaucoma, HLD, and Tobacco Smoking presenting to the ED via {. PXBQJ:34130} c/o ***. Denies any associated: {.NAASSOC:79617}. Precipitating Factors: {. NAPRECIP:64508:a}. Denies preceding {. NAPRECIP:02608}. Worsening Factors: {. EXACERBATINGFACTORS:92815}. Denies worsening with {.EXACERBATINGFACTORS:63174:o}. Alleviating Factors: {.ALLEVIATINGFACTORS:45298}. Denies any alleviation with {.ALLEVIATINGFACTORS:90921:o}. States ***history of similar previous episodes. States they have otherwise been feeling well***. Taking all medications as indicated: {Response; yes/no:64}. Per Chart Review: PMH as noted above obtained via outpatient chart review.    ***      REVIEW OF SYSTEMS       Review of Systems      PAST MEDICAL HISTORY     Past Medical History:   Diagnosis Date    Hyperlipidemia        SURGICAL HISTORY       Past Surgical History:   Procedure Laterality Date    UPPER GASTROINTESTINAL ENDOSCOPY N/A 1/19/2023    EGD DIAGNOSTIC ONLY     (Ok per Dr. Mcginnis  to schedule ) performed by Cecilia Bolivar MD at [address] [place] [address] N/A 1/19/2023    ENDOSCOPIC ULTRASOUND    ([facility] per Dr. Mcginnis  to schedule ) performed by Cecilia Bolivar MD at [address] [place] [address]       Family History   Problem Relation Age of Onset    Colon Cancer Neg Hx        SOCIAL HISTORY       Social History     Socioeconomic History    Marital status:    Tobacco Use    Smoking status: Every Day     Packs/day: 0.25     Types: Cigarettes     Last attempt

## 2023-06-23 NOTE — ED TRIAGE NOTES
Pt presents to Er with daughter from fall on hospital grounds. Pt was walking with heavy bags and thinks she lost her balance, she is not on thinners and had no LOC. Pt did hit the back of her head and has small abrasion to the knee. Pt is A&Ox4, warm and dry.

## 2023-06-30 ASSESSMENT — ENCOUNTER SYMPTOMS
FACIAL SWELLING: 0
NAUSEA: 0
ABDOMINAL PAIN: 0
SHORTNESS OF BREATH: 0
VOMITING: 0
CHEST TIGHTNESS: 1
EYE PAIN: 0
BACK PAIN: 0

## 2025-01-15 ENCOUNTER — OFFICE VISIT (OUTPATIENT)
Dept: OBGYN CLINIC | Age: 85
End: 2025-01-15
Payer: MEDICARE

## 2025-01-15 VITALS
DIASTOLIC BLOOD PRESSURE: 58 MMHG | SYSTOLIC BLOOD PRESSURE: 108 MMHG | HEIGHT: 61 IN | WEIGHT: 86 LBS | BODY MASS INDEX: 16.24 KG/M2 | HEART RATE: 60 BPM

## 2025-01-15 DIAGNOSIS — N39.41 URGE INCONTINENCE: Primary | ICD-10-CM

## 2025-01-15 PROCEDURE — 1159F MED LIST DOCD IN RCRD: CPT | Performed by: OBSTETRICS & GYNECOLOGY

## 2025-01-15 PROCEDURE — G8400 PT W/DXA NO RESULTS DOC: HCPCS | Performed by: OBSTETRICS & GYNECOLOGY

## 2025-01-15 PROCEDURE — G8419 CALC BMI OUT NRM PARAM NOF/U: HCPCS | Performed by: OBSTETRICS & GYNECOLOGY

## 2025-01-15 PROCEDURE — 1090F PRES/ABSN URINE INCON ASSESS: CPT | Performed by: OBSTETRICS & GYNECOLOGY

## 2025-01-15 PROCEDURE — 0509F URINE INCON PLAN DOCD: CPT | Performed by: OBSTETRICS & GYNECOLOGY

## 2025-01-15 PROCEDURE — 1123F ACP DISCUSS/DSCN MKR DOCD: CPT | Performed by: OBSTETRICS & GYNECOLOGY

## 2025-01-15 PROCEDURE — G8427 DOCREV CUR MEDS BY ELIG CLIN: HCPCS | Performed by: OBSTETRICS & GYNECOLOGY

## 2025-01-15 PROCEDURE — 4004F PT TOBACCO SCREEN RCVD TLK: CPT | Performed by: OBSTETRICS & GYNECOLOGY

## 2025-01-15 PROCEDURE — 99204 OFFICE O/P NEW MOD 45 MIN: CPT | Performed by: OBSTETRICS & GYNECOLOGY

## 2025-01-15 RX ORDER — POLYETHYLENE GLYCOL 400 AND PROPYLENE GLYCOL 4; 3 MG/ML; MG/ML
1 SOLUTION/ DROPS OPHTHALMIC PRN
COMMUNITY

## 2025-01-15 SDOH — ECONOMIC STABILITY: FOOD INSECURITY: WITHIN THE PAST 12 MONTHS, YOU WORRIED THAT YOUR FOOD WOULD RUN OUT BEFORE YOU GOT MONEY TO BUY MORE.: NEVER TRUE

## 2025-01-15 SDOH — ECONOMIC STABILITY: FOOD INSECURITY: WITHIN THE PAST 12 MONTHS, THE FOOD YOU BOUGHT JUST DIDN'T LAST AND YOU DIDN'T HAVE MONEY TO GET MORE.: NEVER TRUE

## 2025-01-15 ASSESSMENT — PATIENT HEALTH QUESTIONNAIRE - PHQ9
SUM OF ALL RESPONSES TO PHQ QUESTIONS 1-9: 0
SUM OF ALL RESPONSES TO PHQ QUESTIONS 1-9: 0
1. LITTLE INTEREST OR PLEASURE IN DOING THINGS: NOT AT ALL
SUM OF ALL RESPONSES TO PHQ9 QUESTIONS 1 & 2: 0
2. FEELING DOWN, DEPRESSED OR HOPELESS: NOT AT ALL
SUM OF ALL RESPONSES TO PHQ QUESTIONS 1-9: 0
SUM OF ALL RESPONSES TO PHQ QUESTIONS 1-9: 0

## 2025-01-16 NOTE — PROGRESS NOTES
Lorie Spaulding is a 84 y.o. female who presents here today for complaints of Incontinence (She currently has Medtronic Interstim that was put in about 9 years ago. )        History of Present Illness  The patient is an 84-year-old female who presents for evaluation of her Medtronic InterStim device. She is accompanied by her daughter.    She has been utilizing a Medtronic InterStim device for the past 9.5 years, which has provided her with approximately 80 percent relief. She was informed by Bony last week that the battery of the device is nearing depletion and will need to be replaced. She has been managing the device at a low setting, which has allowed it to function for an extended period. She was advised that the newer models are compatible with MRI scans, unlike her current device. She recalls a previous trial period where the device was placed on her back, resulting in severe rashes and wetness, but these symptoms resolved after the device was implanted. She is seeking clarification on whether she will be able to engage in activities such as bowling post-replacement. She has been mindful of her diet throughout her life, particularly when planning to travel. She was informed by Dr. Dior that her sphincter was damaged during childbirth, a condition that can be repaired and does not typically recur after replacement. She reports no urinary or fecal incontinence, although she did experience fecal incontinence in the past, which has since resolved.         Vitals:  BP (!) 108/58 (Site: Right Upper Arm, Position: Sitting, Cuff Size: Small Adult)   Pulse 60   Ht 1.549 m (5' 1\")   Wt 39 kg (86 lb)   BMI 16.25 kg/m²   Allergies:  Tramadol  Past Medical History:   Diagnosis Date    Hyperlipidemia      Past Surgical History:   Procedure Laterality Date    UPPER GASTROINTESTINAL ENDOSCOPY N/A 1/19/2023    EGD DIAGNOSTIC ONLY     (Ok per Dr. Maradiaga to schedule ) performed by Jong Maradiaga MD at [facility] GASTRO

## 2025-01-20 ENCOUNTER — PREP FOR PROCEDURE (OUTPATIENT)
Dept: OBGYN CLINIC | Age: 85
End: 2025-01-20

## 2025-01-20 DIAGNOSIS — N39.41 URGE INCONTINENCE: ICD-10-CM

## 2025-01-20 PROBLEM — R15.9 FECAL INCONTINENCE: Status: ACTIVE | Noted: 2025-01-20

## 2025-01-20 PROBLEM — T83.110A: Status: ACTIVE | Noted: 2025-01-20

## 2025-01-21 RX ORDER — SODIUM CHLORIDE 0.9 % (FLUSH) 0.9 %
5-40 SYRINGE (ML) INJECTION EVERY 12 HOURS SCHEDULED
Status: CANCELLED | OUTPATIENT
Start: 2025-01-23

## 2025-01-21 RX ORDER — ACETAMINOPHEN 500 MG
1000 TABLET ORAL ONCE
Status: CANCELLED | OUTPATIENT
Start: 2025-01-23 | End: 2025-01-21

## 2025-01-21 RX ORDER — SODIUM CHLORIDE 0.9 % (FLUSH) 0.9 %
5-40 SYRINGE (ML) INJECTION PRN
Status: CANCELLED | OUTPATIENT
Start: 2025-01-23

## 2025-01-21 RX ORDER — SODIUM CHLORIDE, SODIUM LACTATE, POTASSIUM CHLORIDE, CALCIUM CHLORIDE 600; 310; 30; 20 MG/100ML; MG/100ML; MG/100ML; MG/100ML
INJECTION, SOLUTION INTRAVENOUS CONTINUOUS
Status: CANCELLED | OUTPATIENT
Start: 2025-01-23

## 2025-01-21 RX ORDER — SODIUM CHLORIDE 9 MG/ML
INJECTION, SOLUTION INTRAVENOUS PRN
Status: CANCELLED | OUTPATIENT
Start: 2025-01-23

## 2025-01-23 ENCOUNTER — HOSPITAL ENCOUNTER (OUTPATIENT)
Age: 85
Setting detail: OUTPATIENT SURGERY
Discharge: HOME | End: 2025-01-23
Attending: OBSTETRICS & GYNECOLOGY | Admitting: OBSTETRICS & GYNECOLOGY
Payer: MEDICARE

## 2025-01-23 ENCOUNTER — ANESTHESIA EVENT (OUTPATIENT)
Dept: OPERATING ROOM | Age: 85
End: 2025-01-23
Payer: MEDICARE

## 2025-01-23 ENCOUNTER — ANESTHESIA (OUTPATIENT)
Dept: OPERATING ROOM | Age: 85
End: 2025-01-23
Payer: MEDICARE

## 2025-01-23 ENCOUNTER — APPOINTMENT (OUTPATIENT)
Dept: GENERAL RADIOLOGY | Age: 85
End: 2025-01-23
Attending: OBSTETRICS & GYNECOLOGY
Payer: MEDICARE

## 2025-01-23 VITALS
HEART RATE: 58 BPM | HEIGHT: 61 IN | DIASTOLIC BLOOD PRESSURE: 62 MMHG | RESPIRATION RATE: 14 BRPM | SYSTOLIC BLOOD PRESSURE: 130 MMHG | OXYGEN SATURATION: 98 % | WEIGHT: 82.6 LBS | TEMPERATURE: 97 F | BODY MASS INDEX: 15.6 KG/M2

## 2025-01-23 DIAGNOSIS — G89.18 POSTOPERATIVE PAIN: Primary | ICD-10-CM

## 2025-01-23 DIAGNOSIS — R52 PAIN: ICD-10-CM

## 2025-01-23 LAB
ERYTHROCYTE [DISTWIDTH] IN BLOOD BY AUTOMATED COUNT: 12.8 % (ref 11.5–14.5)
HCT VFR BLD AUTO: 34.9 % (ref 37–47)
HGB BLD-MCNC: 11.6 G/DL (ref 12–16)
MCH RBC QN AUTO: 32.7 PG (ref 27–31.3)
MCHC RBC AUTO-ENTMCNC: 33.2 % (ref 33–37)
MCV RBC AUTO: 98.3 FL (ref 79.4–94.8)
PLATELET # BLD AUTO: 207 K/UL (ref 130–400)
RBC # BLD AUTO: 3.55 M/UL (ref 4.2–5.4)
WBC # BLD AUTO: 6 K/UL (ref 4.8–10.8)

## 2025-01-23 PROCEDURE — 7100000000 HC PACU RECOVERY - FIRST 15 MIN: Performed by: OBSTETRICS & GYNECOLOGY

## 2025-01-23 PROCEDURE — 2580000003 HC RX 258: Performed by: OBSTETRICS & GYNECOLOGY

## 2025-01-23 PROCEDURE — 3600000014 HC SURGERY LEVEL 4 ADDTL 15MIN: Performed by: OBSTETRICS & GYNECOLOGY

## 2025-01-23 PROCEDURE — 2500000003 HC RX 250 WO HCPCS

## 2025-01-23 PROCEDURE — 6360000002 HC RX W HCPCS

## 2025-01-23 PROCEDURE — 3600000004 HC SURGERY LEVEL 4 BASE: Performed by: OBSTETRICS & GYNECOLOGY

## 2025-01-23 PROCEDURE — C1787 PATIENT PROGR, NEUROSTIM: HCPCS | Performed by: OBSTETRICS & GYNECOLOGY

## 2025-01-23 PROCEDURE — 3700000000 HC ANESTHESIA ATTENDED CARE: Performed by: OBSTETRICS & GYNECOLOGY

## 2025-01-23 PROCEDURE — 6360000002 HC RX W HCPCS: Performed by: OBSTETRICS & GYNECOLOGY

## 2025-01-23 PROCEDURE — C1767 GENERATOR, NEURO NON-RECHARG: HCPCS | Performed by: OBSTETRICS & GYNECOLOGY

## 2025-01-23 PROCEDURE — 7100000010 HC PHASE II RECOVERY - FIRST 15 MIN: Performed by: OBSTETRICS & GYNECOLOGY

## 2025-01-23 PROCEDURE — 2709999900 HC NON-CHARGEABLE SUPPLY: Performed by: OBSTETRICS & GYNECOLOGY

## 2025-01-23 PROCEDURE — C1889 IMPLANT/INSERT DEVICE, NOC: HCPCS | Performed by: OBSTETRICS & GYNECOLOGY

## 2025-01-23 PROCEDURE — 3700000001 HC ADD 15 MINUTES (ANESTHESIA): Performed by: OBSTETRICS & GYNECOLOGY

## 2025-01-23 PROCEDURE — 6370000000 HC RX 637 (ALT 250 FOR IP): Performed by: OBSTETRICS & GYNECOLOGY

## 2025-01-23 PROCEDURE — 7100000011 HC PHASE II RECOVERY - ADDTL 15 MIN: Performed by: OBSTETRICS & GYNECOLOGY

## 2025-01-23 PROCEDURE — C1883 ADAPT/EXT, PACING/NEURO LEAD: HCPCS | Performed by: OBSTETRICS & GYNECOLOGY

## 2025-01-23 PROCEDURE — C1778 LEAD, NEUROSTIMULATOR: HCPCS | Performed by: OBSTETRICS & GYNECOLOGY

## 2025-01-23 PROCEDURE — C1897 LEAD, NEUROSTIM TEST KIT: HCPCS | Performed by: OBSTETRICS & GYNECOLOGY

## 2025-01-23 PROCEDURE — 7100000001 HC PACU RECOVERY - ADDTL 15 MIN: Performed by: OBSTETRICS & GYNECOLOGY

## 2025-01-23 PROCEDURE — 85027 COMPLETE CBC AUTOMATED: CPT

## 2025-01-23 PROCEDURE — 2500000003 HC RX 250 WO HCPCS: Performed by: OBSTETRICS & GYNECOLOGY

## 2025-01-23 DEVICE — NEUROSTIMULATOR INTERSTIM X RECHRGE FREE TORQ WRNCH PROD: Type: IMPLANTABLE DEVICE | Site: BUTTOCKS | Status: FUNCTIONAL

## 2025-01-23 DEVICE — ENVELOPE PLSE GENRTR M W2.7XL2.5IN NEURO ANTIBACT ABSRB: Type: IMPLANTABLE DEVICE | Site: BUTTOCKS | Status: FUNCTIONAL

## 2025-01-23 DEVICE — KIT LEAD SURE SCAN INTERSTIM MRI: Type: IMPLANTABLE DEVICE | Site: SACRUM | Status: FUNCTIONAL

## 2025-01-23 RX ORDER — LIDOCAINE HYDROCHLORIDE 10 MG/ML
INJECTION, SOLUTION EPIDURAL; INFILTRATION; INTRACAUDAL; PERINEURAL
Status: DISCONTINUED | OUTPATIENT
Start: 2025-01-23 | End: 2025-01-23 | Stop reason: SDUPTHER

## 2025-01-23 RX ORDER — SODIUM CHLORIDE 9 MG/ML
INJECTION, SOLUTION INTRAVENOUS PRN
Status: DISCONTINUED | OUTPATIENT
Start: 2025-01-23 | End: 2025-01-23 | Stop reason: HOSPADM

## 2025-01-23 RX ORDER — FAMOTIDINE 20 MG/1
20 TABLET, FILM COATED ORAL 2 TIMES DAILY
Status: DISCONTINUED | OUTPATIENT
Start: 2025-01-23 | End: 2025-01-23 | Stop reason: HOSPADM

## 2025-01-23 RX ORDER — EPHEDRINE SULFATE/0.9% NACL/PF 25 MG/5 ML
SYRINGE (ML) INTRAVENOUS
Status: DISCONTINUED | OUTPATIENT
Start: 2025-01-23 | End: 2025-01-23 | Stop reason: SDUPTHER

## 2025-01-23 RX ORDER — ONDANSETRON 4 MG/1
4 TABLET, ORALLY DISINTEGRATING ORAL EVERY 8 HOURS PRN
Status: DISCONTINUED | OUTPATIENT
Start: 2025-01-23 | End: 2025-01-23 | Stop reason: HOSPADM

## 2025-01-23 RX ORDER — OXYCODONE HYDROCHLORIDE 5 MG/1
10 TABLET ORAL EVERY 4 HOURS PRN
Status: DISCONTINUED | OUTPATIENT
Start: 2025-01-23 | End: 2025-01-23 | Stop reason: HOSPADM

## 2025-01-23 RX ORDER — OXYCODONE AND ACETAMINOPHEN 5; 325 MG/1; MG/1
2 TABLET ORAL NIGHTLY PRN
Qty: 10 TABLET | Refills: 0 | Status: SHIPPED | OUTPATIENT
Start: 2025-01-23 | End: 2025-01-28

## 2025-01-23 RX ORDER — OXYCODONE HYDROCHLORIDE 5 MG/1
5 TABLET ORAL
Status: DISCONTINUED | OUTPATIENT
Start: 2025-01-23 | End: 2025-01-23 | Stop reason: HOSPADM

## 2025-01-23 RX ORDER — ACETAMINOPHEN 500 MG
1000 TABLET ORAL EVERY 6 HOURS PRN
Qty: 60 TABLET | Refills: 0 | Status: SHIPPED | OUTPATIENT
Start: 2025-01-23

## 2025-01-23 RX ORDER — SODIUM CHLORIDE 0.9 % (FLUSH) 0.9 %
5-40 SYRINGE (ML) INJECTION EVERY 12 HOURS SCHEDULED
Status: DISCONTINUED | OUTPATIENT
Start: 2025-01-23 | End: 2025-01-23 | Stop reason: HOSPADM

## 2025-01-23 RX ORDER — SODIUM CHLORIDE 0.9 % (FLUSH) 0.9 %
5-40 SYRINGE (ML) INJECTION PRN
Status: DISCONTINUED | OUTPATIENT
Start: 2025-01-23 | End: 2025-01-23 | Stop reason: HOSPADM

## 2025-01-23 RX ORDER — METOCLOPRAMIDE HYDROCHLORIDE 5 MG/ML
10 INJECTION INTRAMUSCULAR; INTRAVENOUS
Status: DISCONTINUED | OUTPATIENT
Start: 2025-01-23 | End: 2025-01-23 | Stop reason: HOSPADM

## 2025-01-23 RX ORDER — CEPHALEXIN 500 MG/1
500 CAPSULE ORAL 2 TIMES DAILY
Qty: 28 CAPSULE | Refills: 0 | Status: SHIPPED | OUTPATIENT
Start: 2025-01-23 | End: 2025-02-06

## 2025-01-23 RX ORDER — SUCCINYLCHOLINE/SOD CL,ISO/PF 100 MG/5ML
SYRINGE (ML) INTRAVENOUS
Status: DISCONTINUED | OUTPATIENT
Start: 2025-01-23 | End: 2025-01-23 | Stop reason: SDUPTHER

## 2025-01-23 RX ORDER — HYDROMORPHONE HYDROCHLORIDE 1 MG/ML
1 INJECTION, SOLUTION INTRAMUSCULAR; INTRAVENOUS; SUBCUTANEOUS
Status: DISCONTINUED | OUTPATIENT
Start: 2025-01-23 | End: 2025-01-23 | Stop reason: HOSPADM

## 2025-01-23 RX ORDER — DIPHENHYDRAMINE HYDROCHLORIDE 50 MG/ML
12.5 INJECTION INTRAMUSCULAR; INTRAVENOUS
Status: DISCONTINUED | OUTPATIENT
Start: 2025-01-23 | End: 2025-01-23 | Stop reason: HOSPADM

## 2025-01-23 RX ORDER — OXYCODONE HYDROCHLORIDE 5 MG/1
5 TABLET ORAL EVERY 4 HOURS PRN
Status: DISCONTINUED | OUTPATIENT
Start: 2025-01-23 | End: 2025-01-23 | Stop reason: HOSPADM

## 2025-01-23 RX ORDER — NALOXONE HYDROCHLORIDE 0.4 MG/ML
INJECTION, SOLUTION INTRAMUSCULAR; INTRAVENOUS; SUBCUTANEOUS PRN
Status: DISCONTINUED | OUTPATIENT
Start: 2025-01-23 | End: 2025-01-23 | Stop reason: HOSPADM

## 2025-01-23 RX ORDER — ONDANSETRON 2 MG/ML
INJECTION INTRAMUSCULAR; INTRAVENOUS
Status: DISCONTINUED | OUTPATIENT
Start: 2025-01-23 | End: 2025-01-23 | Stop reason: SDUPTHER

## 2025-01-23 RX ORDER — FENTANYL CITRATE 0.05 MG/ML
50 INJECTION, SOLUTION INTRAMUSCULAR; INTRAVENOUS EVERY 10 MIN PRN
Status: DISCONTINUED | OUTPATIENT
Start: 2025-01-23 | End: 2025-01-23 | Stop reason: HOSPADM

## 2025-01-23 RX ORDER — ACETAMINOPHEN 500 MG
1000 TABLET ORAL EVERY 8 HOURS PRN
Status: DISCONTINUED | OUTPATIENT
Start: 2025-01-23 | End: 2025-01-23 | Stop reason: HOSPADM

## 2025-01-23 RX ORDER — SODIUM CHLORIDE, SODIUM LACTATE, POTASSIUM CHLORIDE, CALCIUM CHLORIDE 600; 310; 30; 20 MG/100ML; MG/100ML; MG/100ML; MG/100ML
INJECTION, SOLUTION INTRAVENOUS CONTINUOUS
Status: DISCONTINUED | OUTPATIENT
Start: 2025-01-23 | End: 2025-01-23 | Stop reason: HOSPADM

## 2025-01-23 RX ORDER — MEPERIDINE HYDROCHLORIDE 25 MG/ML
12.5 INJECTION INTRAMUSCULAR; INTRAVENOUS; SUBCUTANEOUS
Status: DISCONTINUED | OUTPATIENT
Start: 2025-01-23 | End: 2025-01-23 | Stop reason: HOSPADM

## 2025-01-23 RX ORDER — IBUPROFEN 600 MG/1
600 TABLET, FILM COATED ORAL EVERY 6 HOURS PRN
Qty: 60 TABLET | Refills: 1 | Status: SHIPPED | OUTPATIENT
Start: 2025-01-23

## 2025-01-23 RX ORDER — ACETAMINOPHEN 500 MG
1000 TABLET ORAL ONCE
Status: COMPLETED | OUTPATIENT
Start: 2025-01-23 | End: 2025-01-23

## 2025-01-23 RX ORDER — ONDANSETRON 2 MG/ML
4 INJECTION INTRAMUSCULAR; INTRAVENOUS EVERY 6 HOURS PRN
Status: DISCONTINUED | OUTPATIENT
Start: 2025-01-23 | End: 2025-01-23 | Stop reason: HOSPADM

## 2025-01-23 RX ORDER — PROPOFOL 10 MG/ML
INJECTION, EMULSION INTRAVENOUS
Status: DISCONTINUED | OUTPATIENT
Start: 2025-01-23 | End: 2025-01-23 | Stop reason: SDUPTHER

## 2025-01-23 RX ORDER — LIDOCAINE HYDROCHLORIDE 10 MG/ML
1 INJECTION, SOLUTION EPIDURAL; INFILTRATION; INTRACAUDAL; PERINEURAL
Status: DISCONTINUED | OUTPATIENT
Start: 2025-01-23 | End: 2025-01-23 | Stop reason: HOSPADM

## 2025-01-23 RX ORDER — DEXAMETHASONE SODIUM PHOSPHATE 4 MG/ML
INJECTION, SOLUTION INTRA-ARTICULAR; INTRALESIONAL; INTRAMUSCULAR; INTRAVENOUS; SOFT TISSUE
Status: DISCONTINUED | OUTPATIENT
Start: 2025-01-23 | End: 2025-01-23 | Stop reason: SDUPTHER

## 2025-01-23 RX ORDER — ONDANSETRON 2 MG/ML
4 INJECTION INTRAMUSCULAR; INTRAVENOUS
Status: DISCONTINUED | OUTPATIENT
Start: 2025-01-23 | End: 2025-01-23 | Stop reason: HOSPADM

## 2025-01-23 RX ORDER — KETOROLAC TROMETHAMINE 15 MG/ML
15 INJECTION, SOLUTION INTRAMUSCULAR; INTRAVENOUS EVERY 6 HOURS
Status: DISCONTINUED | OUTPATIENT
Start: 2025-01-23 | End: 2025-01-23 | Stop reason: HOSPADM

## 2025-01-23 RX ORDER — LIDOCAINE HYDROCHLORIDE AND EPINEPHRINE 10; 10 MG/ML; UG/ML
INJECTION, SOLUTION INFILTRATION; PERINEURAL PRN
Status: DISCONTINUED | OUTPATIENT
Start: 2025-01-23 | End: 2025-01-23 | Stop reason: HOSPADM

## 2025-01-23 RX ADMIN — PROPOFOL 40 MG: 10 INJECTION, EMULSION INTRAVENOUS at 13:09

## 2025-01-23 RX ADMIN — ONDANSETRON 4 MG: 2 INJECTION, SOLUTION INTRAMUSCULAR; INTRAVENOUS at 14:04

## 2025-01-23 RX ADMIN — PROPOFOL 60 MG: 10 INJECTION, EMULSION INTRAVENOUS at 12:49

## 2025-01-23 RX ADMIN — Medication 40 MG: at 12:49

## 2025-01-23 RX ADMIN — CEFOXITIN SODIUM 2000 MG: 2 POWDER, FOR SOLUTION INTRAVENOUS at 12:58

## 2025-01-23 RX ADMIN — OXYCODONE 10 MG: 5 TABLET ORAL at 15:55

## 2025-01-23 RX ADMIN — LIDOCAINE HYDROCHLORIDE 35 MG: 10 INJECTION, SOLUTION EPIDURAL; INFILTRATION; INTRACAUDAL; PERINEURAL at 12:49

## 2025-01-23 RX ADMIN — EPHEDRINE SULFATE 5 MG: 5 INJECTION INTRAVENOUS at 13:12

## 2025-01-23 RX ADMIN — EPHEDRINE SULFATE 5 MG: 5 INJECTION INTRAVENOUS at 13:34

## 2025-01-23 RX ADMIN — SODIUM CHLORIDE, POTASSIUM CHLORIDE, SODIUM LACTATE AND CALCIUM CHLORIDE 1000 ML: 600; 310; 30; 20 INJECTION, SOLUTION INTRAVENOUS at 10:38

## 2025-01-23 RX ADMIN — EPHEDRINE SULFATE 5 MG: 5 INJECTION INTRAVENOUS at 13:19

## 2025-01-23 RX ADMIN — ACETAMINOPHEN 1000 MG: 500 TABLET ORAL at 10:39

## 2025-01-23 RX ADMIN — KETOROLAC TROMETHAMINE 15 MG: 15 INJECTION, SOLUTION INTRAMUSCULAR; INTRAVENOUS at 15:31

## 2025-01-23 RX ADMIN — DEXAMETHASONE SODIUM PHOSPHATE 4 MG: 4 INJECTION INTRA-ARTICULAR; INTRALESIONAL; INTRAMUSCULAR; INTRAVENOUS; SOFT TISSUE at 12:49

## 2025-01-23 RX ADMIN — EPHEDRINE SULFATE 5 MG: 5 INJECTION INTRAVENOUS at 13:05

## 2025-01-23 RX ADMIN — EPHEDRINE SULFATE 5 MG: 5 INJECTION INTRAVENOUS at 13:32

## 2025-01-23 RX ADMIN — EPHEDRINE SULFATE 5 MG: 5 INJECTION INTRAVENOUS at 13:25

## 2025-01-23 ASSESSMENT — PAIN - FUNCTIONAL ASSESSMENT: PAIN_FUNCTIONAL_ASSESSMENT: ACTIVITIES ARE NOT PREVENTED

## 2025-01-23 ASSESSMENT — LIFESTYLE VARIABLES: SMOKING_STATUS: 1

## 2025-01-23 ASSESSMENT — PAIN SCALES - GENERAL
PAINLEVEL_OUTOF10: 7
PAINLEVEL_OUTOF10: 7
PAINLEVEL_OUTOF10: 0

## 2025-01-23 ASSESSMENT — PAIN DESCRIPTION - LOCATION: LOCATION: THROAT;BUTTOCKS

## 2025-01-23 NOTE — ANESTHESIA PRE PROCEDURE
Department of Anesthesiology  Preprocedure Note       Name:  Lorie Spaulding   Age:  84 y.o.  :  1940                                          MRN:  [id number]         Date:  2025      Surgeon: Surgeon(s):  Heather Alarcon MD    Procedure: Procedure(s):  MEDTRONIC INTERSTIM REPLACEMENT STAGE 1 AND STAGE 2   PAT ON ADMIT  e-mailed Ankita for 11:00 am AWARE  C-ARM    Medications prior to admission:   Prior to Admission medications    Medication Sig Start Date End Date Taking? Authorizing Provider   polyethyl glycol-propyl glycol 0.4-0.3 % (SYSTANE) 0.4-0.3 % ophthalmic solution 1 drop as needed for Dry Eyes   Yes Jose Hernandez MD   bimatoprost (LUMIGAN) 0.01 % SOLN ophthalmic drops Place 1 drop into both eyes nightly   Yes Jose Hernandez MD   cycloSPORINE (RESTASIS) 0.05 % ophthalmic emulsion 1 drop 2 times daily   Yes Jose Hernandez MD   ALENDRONATE SODIUM PO Take by mouth   Yes Jose Hernandez MD   atorvastatin (LIPITOR) 10 MG tablet TAKE 1 TABLET EVERY DAY AT SUPPER TIME. 22  Yes Jose Hernandez MD   dicyclomine (BENTYL) 20 MG tablet Take 1 tablet by mouth daily 10/18/21  Yes Jose Hernandez MD   LORazepam (ATIVAN) 0.5 MG tablet Take 1 tablet by mouth daily as needed. 3/12/19  Yes Jose Hernandez MD   acetaminophen (TYLENOL) 500 MG tablet Take 2 tablets by mouth every 6 hours as needed for Pain or Fever 23   Prashant Isaac MD   ibuprofen (IBU) 400 MG tablet Take 1 tablet by mouth every 6 hours as needed for Pain  Patient not taking: Reported on 1/15/2025 6/23/23   Prashant Isaac MD   b complex vitamins capsule Take 1 capsule by mouth daily  Patient not taking: Reported on 1/15/2025    Jose Hernandez MD   dorzolamide (TRUSOPT) 2 % ophthalmic solution 2 drops 3 times daily  Patient not taking: Reported on 1/15/2025    Jose Hernandez MD   calcium carbonate (OYSTER SHELL CALCIUM 500 MG) 1250 (500 Ca) MG tablet Take 1 tablet by

## 2025-01-23 NOTE — OP NOTE
used to incise the skin all the way down to the device which was pulled out of the incision and then the connecting wire is cut.    Locating the lead insertion    By gently pulling on the distal end of the wire, a skin depression will occur more medially over the site of the percutaneous implant of the tined lead where it goes straight to right S3 foramen.    Dissection to Marker Band B  A 0.5 cm incision is made over the site of the greatest depression and dissection is carried out to find the lead. The lead was then grasped with a hemostat. The distal end of the lead wire can now be pulled through to the new 3 cm incision. Dissection is then continued deep to the level of the fascia until Marker Band B is reached. ( The lead wire should never be  pulled with significant tension before Marker Band B, as the region between Markers B and C is very thinly insulated and will break with any tightness.)     Removal of lead wire  Using a right-angle clamp just below Marker Band B, a rocking motion with gentle traction will allow the fascia to separate from the tines without breaking the leads. As soon as Marker Band A is located, grabbing the lead wire below this venkatesh will allow the remaining length of the lead wire to easily slide out.  Blood loss was minimal .      2) new device placement :     DETAILS OF THE OPERATION: Patient still under general anesthesia through the IV . The patient's back was prepped and draped in the usual sterile fashion. Under fluoroscopy, the needle placement was confirmed in right S3 foramen. Also by the toe movement and abdelrahman ( anal sphincter contractions), indicating the proper positioning of the needle. A wire was placed. The tract was dilated and lead was placed.  Most of the leads had very low amplitude and stimulation with great responses as mentioned above . Lead was tunneled under the skin and was brought out through an incision on the right upper buttocks, at the previous pocket for

## 2025-01-23 NOTE — H&P
Lorie Spaulding is a 84 y.o. female who presents here today for complaints of Incontinence (She currently has Medtronic Interstim that was put in about 9 years ago. )         History of Present Illness  The patient is an 84-year-old female who presents for evaluation of her Medtronic InterStim device. She is accompanied by her daughter.     She has been utilizing a Medtronic InterStim device for the past 9.5 years, which has provided her with approximately 80 percent relief. She was informed by Bony last week that the battery of the device is nearing depletion and will need to be replaced. She has been managing the device at a low setting, which has allowed it to function for an extended period. She was advised that the newer models are compatible with MRI scans, unlike her current device. She recalls a previous trial period where the device was placed on her back, resulting in severe rashes and wetness, but these symptoms resolved after the device was implanted. She is seeking clarification on whether she will be able to engage in activities such as bowling post-replacement. She has been mindful of her diet throughout her life, particularly when planning to travel. She was informed by Dr. Dior that her sphincter was damaged during childbirth, a condition that can be repaired and does not typically recur after replacement. She reports no urinary or fecal incontinence, although she did experience fecal incontinence in the past, which has since resolved.           Vitals:  BP (!) 108/58 (Site: Right Upper Arm, Position: Sitting, Cuff Size: Small Adult)   Pulse 60   Ht 1.549 m (5' 1\")   Wt 39 kg (86 lb)   BMI 16.25 kg/m²   Allergies:  Tramadol  Past Medical History        Past Medical History:   Diagnosis Date    Hyperlipidemia           Past Surgical History         Past Surgical History:   Procedure Laterality Date    UPPER GASTROINTESTINAL ENDOSCOPY N/A 1/19/2023     EGD DIAGNOSTIC ONLY     (Ok per Dr. Maradiaga

## 2025-01-23 NOTE — ANESTHESIA POSTPROCEDURE EVALUATION
Department of Anesthesiology  Postprocedure Note    Patient: Lorie Spaulding  MRN: [id number]  YOB: 1940  Date of evaluation: 1/23/2025    Procedure Summary       Date: 01/23/25 Room / Location: [address]    Anesthesia Start: 1243 Anesthesia Stop: 1426    Procedure: MEDTRONIC INTERSTIM REPLACEMENT STAGE 1 AND STAGE 2 Diagnosis:       Urge incontinence      Fecal incontinence      Mechanical breakdown of urinary electronic stimulator device (HCC)      (Urge incontinence [N39.41])      (Fecal incontinence [R15.9])      (Mechanical breakdown of urinary electronic stimulator device (HCC) [T83.110A])    Surgeons: Heather Alarcon MD Responsible Provider: Yoni Emerson DO    Anesthesia Type: general ASA Status: 2            Anesthesia Type: No value filed.    Felix Phase I: Felix Score: 10    Felix Phase II:      Anesthesia Post Evaluation    Patient location during evaluation: bedside  Patient participation: complete - patient participated  Level of consciousness: awake and awake and alert  Airway patency: patent  Nausea & Vomiting: no nausea and no vomiting  Cardiovascular status: blood pressure returned to baseline and hemodynamically stable  Respiratory status: acceptable  Hydration status: euvolemic  Pain management: adequate        No notable events documented.

## 2025-01-23 NOTE — PROGRESS NOTES
CLINICAL PHARMACY NOTE: MEDS TO BEDS    Total # of Prescriptions Filled: 4   The following medications were delivered to the patient:  Ibuprofen 600mg Tab  Acetaminophen 500mg Tab  Oxycodone-acetaminophen 5-325mg Tab   Cephalexin 500mg Cap    Additional Documentation:

## 2025-01-23 NOTE — DISCHARGE INSTRUCTIONS
wash your hands BEFORE and AFTER changing your dressing if instructed by your physician,   Proper handwashing includes wetting your hands with clean water, applying soap, lathering your hands by rubbing them together with soap for 20 seconds, rinsing them with clean water, and drying them with a clean towel. If soap and water is not available, alcohol-based  may be applied by rubbing the hands together and allowing them to dry for 20 seconds.  Special Instructions: __________________________________________________________________      PAIN:  Pain after surgery is normal and should be expected. When you go home, the anesthesia wears off, and you may experience increased discomfort. Your provider will give you specific instructions on what pain medication to take at home. Listed below is additional information on treating pain after surgery:  Pain medication can give you an upset stomach. Unless your provider has instructed you not to eat or drink, the pain medication should be taken with a small amount of food. Eating will decrease the chance of an upset stomach.  Pain medication can take about 20-30 minutes to start working, so do not wait until the pain worsens before taking a dose. Remember, always take over-the-counter and prescription drugs only as directed by your provider.  Unless otherwise instructed by your provider, applying ice on or around your surgical site can be a great way to help minimize pain and swelling after surgery.  Apply ice to the affected area a minimum of 4 times daily for no longer than 15-20 minutes at a time. It is very important to protect your skin by NOT applying ice or ice pack directly to your skin. Always have a towel or pillow case between your skin and the ice. Frozen vegetable packs like peas or corn make great inexpensive alternatives for use as an icepack.    FOLLOW UP CARE - Call your Physician if any of the following occur:  Increased swelling, redness, warmth,

## 2025-02-05 ENCOUNTER — OFFICE VISIT (OUTPATIENT)
Dept: OBGYN CLINIC | Age: 85
End: 2025-02-05

## 2025-02-05 VITALS
SYSTOLIC BLOOD PRESSURE: 108 MMHG | BODY MASS INDEX: 16.99 KG/M2 | WEIGHT: 90 LBS | HEIGHT: 61 IN | DIASTOLIC BLOOD PRESSURE: 60 MMHG | HEART RATE: 60 BPM

## 2025-02-05 DIAGNOSIS — Z09 POSTOP CHECK: Primary | ICD-10-CM

## 2025-02-05 PROCEDURE — 99024 POSTOP FOLLOW-UP VISIT: CPT | Performed by: OBSTETRICS & GYNECOLOGY

## 2025-02-11 NOTE — PROGRESS NOTES
Postop Progress Note    Subjective    presents to the office for postop follow up. 2 weeks postop .   History of Present Illness  The patient presents for evaluation of fecal incontinence. She is accompanied by her daughter.    She continues to wear a bandage over the surgical site, expressing concern about the placement of the wires. She reports mild discomfort but no significant pain. The patient has been managing her hygiene with sponge baths and hair washing in the sink, avoiding full showers to prevent the bandage from getting wet. She has been adhering to her antibiotic regimen, with only two doses remaining. Her stool consistency has improved from being soft in the initial days post-surgery to now being formed. She speculates that the initial softness may have been due to the antibiotics or Advil. She has been experiencing a sensation of fluttering in her vaginal area, which she describes as intermittent. She also reports occasional difficulty in reaching the bathroom on time for bowel movements, although this is not a consistent issue. The patient has been adjusting the device settings at home, with her daughter assisting in the process. They have been experimenting with different programs and intensities, noting that she did not experience any sensation when the device was set to program 5. They have also tried program 3, which they found more effective. They have been cautious in making adjustments, ensuring that they do not cause her any discomfort. She has not experienced any pain with the current settings. She has been in contact with [facility], who has been providing guidance on the device settings. She is scheduled for a follow-up call with [facility] in 48 hours. She has been managing her discomfort with oxycodone, taking approximately 1.5 tablets as needed.    MEDICATIONS  Current: oxycodone, Advil       Objective    Vitals:    02/05/25 1132   BP: 108/60   Pulse: 60         General: alert, cooperative and

## 2025-02-19 ENCOUNTER — OFFICE VISIT (OUTPATIENT)
Dept: OBGYN CLINIC | Age: 85
End: 2025-02-19

## 2025-02-19 VITALS
SYSTOLIC BLOOD PRESSURE: 104 MMHG | HEIGHT: 61 IN | DIASTOLIC BLOOD PRESSURE: 62 MMHG | HEART RATE: 68 BPM | BODY MASS INDEX: 16.8 KG/M2 | WEIGHT: 89 LBS

## 2025-02-19 DIAGNOSIS — Z09 POSTOP CHECK: Primary | ICD-10-CM

## 2025-02-19 PROCEDURE — 99024 POSTOP FOLLOW-UP VISIT: CPT | Performed by: OBSTETRICS & GYNECOLOGY

## 2025-02-19 RX ORDER — DORZOLAMIDE HYDROCHLORIDE AND TIMOLOL MALEATE 20; 5 MG/ML; MG/ML
SOLUTION/ DROPS OPHTHALMIC
COMMUNITY
Start: 2025-02-10

## 2025-02-26 NOTE — PROGRESS NOTES
Postop Progress Note    Subjective    presents to the office for postop follow up. 2 weeks     Objective    Vitals:    02/19/25 1244   BP: 104/62   Pulse: 68         General: alert, cooperative and no distress  Buttock Incision: healing well  Vag exam: deferred     Assessment  Doing well postoperatively.    Plan    Doing well   Urge symptoms controlled   Resume normal activity   Return as needed    Heather Alarcon M.D., [email or web address].G

## 2025-03-05 ENCOUNTER — TELEMEDICINE (OUTPATIENT)
Dept: OBGYN CLINIC | Age: 85
End: 2025-03-05
Payer: MEDICARE

## 2025-03-05 DIAGNOSIS — N39.41 URGE INCONTINENCE: Primary | ICD-10-CM

## 2025-03-05 DIAGNOSIS — Z09 POSTOP CHECK: ICD-10-CM

## 2025-03-05 PROCEDURE — 99212 OFFICE O/P EST SF 10 MIN: CPT | Performed by: OBSTETRICS & GYNECOLOGY

## 2025-03-12 NOTE — PROGRESS NOTES
Documentation:  I communicated with the patient and/or health care decision maker about   History of Present Illness  The patient presents via virtual visit for evaluation of her device. She is accompanied by her daughter.    She reports that the device is functioning but causing discomfort in her vaginal area. She has been unable to contact any representatives for assistance. Her daughter notes that the device was set to program 3 at 3.0 on 03/02/2025, which caused discomfort after a few days. The setting was then adjusted to program 3 at 2.9. Previously, on 02/24/2025, the device was set to program 6 at 3.5. They decided to try program 3, but it was uncomfortable at 3.0, so they reduced it to 2.9. The current setting of 2.9 is not causing discomfort. Her daughter also mentions that she experiences a fluttering sensation, which was particularly uncomfortable at the 3.0 setting.    Additionally, she reports constipation, characterized by hard stools resembling rabbit pellets. Her daughter questions if this could be related to the device. There have been no recent dietary changes, and she has no history of constipation over the past 50 years. Her daughter does not believe she is dehydrated. She also reports waking up with small amounts of stool in her pants, but this does not occur during the day.   .   Details of this discussion including any medical advice provided:   Assessment & Plan  1. Device management.  The patient reports feeling the device stimulation in her vagina, which indicates the need for program adjustment or reduction in stimulation intensity. She is currently on program 3 at 2.9, which is not causing discomfort. She is advised to maintain the current setting for a week unless it causes daily discomfort, in which case the intensity should be reduced to 2.8. She is informed that the sensation should not be painful and that a slight flutter is acceptable. She is also advised to experiment with

## (undated) DEVICE — LEAD NERVE STIM 4.32 MM INTERSTIM SURESCAN: Type: IMPLANTABLE DEVICE | Site: SACRUM | Status: NON-FUNCTIONAL

## (undated) DEVICE — TUBING, SUCTION, 1/4" X 10', STRAIGHT: Brand: MEDLINE

## (undated) DEVICE — ENDOSCOPIC TRAY TRNSPRT 20.5X16.5X4.1 IN RECYCL SUGAR PULP

## (undated) DEVICE — BRUSH ENDO COMBO

## (undated) DEVICE — JELLY,LUBE,STERILE,FLIP TOP,TUBE,2-OZ: Brand: MEDLINE

## (undated) DEVICE — GLOVE ORANGE PI 8   MSG9080

## (undated) DEVICE — Device: Brand: BALLOON3

## (undated) DEVICE — SUTURE VICRYL SZ 2-0 L36IN ABSRB UD L36MM CT-1 1/2 CIR J945H

## (undated) DEVICE — STIMULATOR NERVE 4.32 MM PERC EXTN KT INTERSTIM QUAD

## (undated) DEVICE — SYRINGE MED 10ML LUERLOCK TIP W/O SFTY DISP

## (undated) DEVICE — SINGLE PORT MANIFOLD: Brand: NEPTUNE 2

## (undated) DEVICE — LABEL MED MINI W/ MARKER

## (undated) DEVICE — SUTURE MONOCRYL SZ 4-0 L27IN ABSRB UD L19MM PS-2 1/2 CIR PRIM Y426H

## (undated) DEVICE — GLOVE ORANGE PI 8 1/2   MSG9085

## (undated) DEVICE — SWABSTICK MEDICATED 10% POVIDONE IOD PVP SGL ANTISEP SAT

## (undated) DEVICE — DRESSING HYDROFIBER AQUACEL AG ADVANTAGE 3.5X6 IN

## (undated) DEVICE — GLOVE ORTHO 8   MSG9480

## (undated) DEVICE — LIQUIBAND RAPID ADHESIVE 36/CS 0.8ML: Brand: MEDLINE

## (undated) DEVICE — Device: Brand: ENDO SMARTCAP

## (undated) DEVICE — SUTURE PERMA-HAND SZ 2-0 L30IN NONABSORBABLE BLK L26MM SH K833H

## (undated) DEVICE — GENERAL MINOR: Brand: MEDLINE INDUSTRIES, INC.

## (undated) DEVICE — TUBE SET 96 MM 64 MM H2O PERISTALTIC STD AUX CHANNEL

## (undated) DEVICE — PROGRAMMER SMART COMM W/HANDSET F/SACRAL NRVE STIMULATORS

## (undated) DEVICE — KIT ARMOR C DRP COLLAPSIBLE AND SELF EXP TOP CVR FOR FLUOROSCOPIC

## (undated) DEVICE — NEUROSTIMULATOR EXT SM LTWT SGL BTTN H2O RESIST WIRELESS

## (undated) DEVICE — ENDO CARRY-ON PROCEDURE KIT: Brand: ENDO CARRY-ON PROCEDURE KIT

## (undated) DEVICE — CONMED SCOPE SAVER BITE BLOCK, 20X27 MM: Brand: SCOPE SAVER

## (undated) DEVICE — GOWN,AURORA,NONREINFORCED,LARGE: Brand: MEDLINE

## (undated) DEVICE — 1010 S-DRAPE TOWEL DRAPE 10/BX: Brand: STERI-DRAPE™